# Patient Record
Sex: FEMALE | Race: BLACK OR AFRICAN AMERICAN | NOT HISPANIC OR LATINO | Employment: FULL TIME | URBAN - METROPOLITAN AREA
[De-identification: names, ages, dates, MRNs, and addresses within clinical notes are randomized per-mention and may not be internally consistent; named-entity substitution may affect disease eponyms.]

---

## 2019-07-28 ENCOUNTER — HOSPITAL ENCOUNTER (EMERGENCY)
Facility: HOSPITAL | Age: 34
Discharge: HOME | End: 2019-07-28
Attending: EMERGENCY MEDICINE
Payer: MEDICAID

## 2019-07-28 VITALS
WEIGHT: 222.25 LBS | TEMPERATURE: 98 F | BODY MASS INDEX: 39.38 KG/M2 | OXYGEN SATURATION: 100 % | DIASTOLIC BLOOD PRESSURE: 89 MMHG | HEART RATE: 90 BPM | HEIGHT: 63 IN | SYSTOLIC BLOOD PRESSURE: 146 MMHG | RESPIRATION RATE: 18 BRPM

## 2019-07-28 DIAGNOSIS — M25.471 ANKLE EDEMA, BILATERAL: Primary | ICD-10-CM

## 2019-07-28 DIAGNOSIS — M25.472 ANKLE EDEMA, BILATERAL: Primary | ICD-10-CM

## 2019-07-28 LAB
B-HCG UR QL: NEGATIVE
CTP QC/QA: YES

## 2019-07-28 PROCEDURE — 99284 PR EMERGENCY DEPT VISIT,LEVEL IV: ICD-10-PCS | Mod: ,,, | Performed by: EMERGENCY MEDICINE

## 2019-07-28 PROCEDURE — 81025 URINE PREGNANCY TEST: CPT | Performed by: EMERGENCY MEDICINE

## 2019-07-28 PROCEDURE — 99283 EMERGENCY DEPT VISIT LOW MDM: CPT

## 2019-07-28 PROCEDURE — 99284 EMERGENCY DEPT VISIT MOD MDM: CPT | Mod: ,,, | Performed by: EMERGENCY MEDICINE

## 2019-07-28 RX ORDER — HYDROCHLOROTHIAZIDE 12.5 MG/1
12.5 TABLET ORAL DAILY
Qty: 30 TABLET | Refills: 0 | Status: SHIPPED | OUTPATIENT
Start: 2019-07-28 | End: 2019-08-27

## 2019-07-28 NOTE — ED NOTES
LOC: The patient is awake and alert; oriented x 3 and speaking appropriately.  APPEARANCE: Patient resting comfortably, patient is clean and well groomed  SKIN: warm and dry, normal skin turgor & moist mucus membranes, skin intact, no breakdown noted.  MUSCULOSKELETAL: Patient moving all extremities well, no obvious swelling or deformities noted  RESPIRATORY: Airway is open and patent, breath sounds clear throughout all lung fields; respirations are spontaneous, normal effort and rate  CARDIAC: Patient has a normal rate,  peripheral edema noted to lower extremities and feet / ankles/ hands, capillary refill < 3 seconds; No complaints of chest pain   ABDOMEN: Soft and non tender to palpation, no distention noted.

## 2019-07-28 NOTE — ED TRIAGE NOTES
C/o intermittent swelling in her lower legs and ankles/ feet. And hands. Pt unsure of name of HTN med she took 3 yrs ago- Takes nothing now.

## 2019-07-28 NOTE — DISCHARGE INSTRUCTIONS
Please take new medication as directed. Please make sure to follow up with your PCP to discuss today's Emergency Department visit and for further evaluation and management. Please return to the Emergency Department if your symptoms worsen or you develop any additional concerning symptoms.

## 2019-07-29 NOTE — ED PROVIDER NOTES
"Encounter Date: 2019       History     Chief Complaint   Patient presents with    Swelling     both ankles and feet swelling, wake up normal,denies any history     Ms Sahni is a 35 yo female patient that presents to the ED with bilateral ankle and hand edema. Pt denies any trauma or injury. States symptoms occur every few months but resolve without intervention. Pt states that she also take a BP medication every once in a while but cannot remember the name. "Starts with an H and is really long". Symptoms have been present for approximately four days. Denies any pain. Denies any fevers, chills, body aches, headaches, dizziness, numbness, weakness, chest pain, shortness of breath, abdominal pain, N/V/D, urinary symptoms.         Review of patient's allergies indicates:  No Known Allergies  Past Medical History:   Diagnosis Date    Anxiety     Gestational diabetes     Hypertension      Past Surgical History:   Procedure Laterality Date     SECTION      TUBAL LIGATION       History reviewed. No pertinent family history.  Social History     Tobacco Use    Smoking status: Light Tobacco Smoker     Types: Cigarettes   Substance Use Topics    Alcohol use: No     Frequency: Never    Drug use: No     Review of Systems   Constitutional: Negative for fever.   HENT: Negative for sore throat.    Eyes: Negative for visual disturbance.   Respiratory: Negative for shortness of breath.    Cardiovascular: Positive for leg swelling. Negative for chest pain.   Gastrointestinal: Negative for abdominal pain, diarrhea, nausea and vomiting.   Genitourinary: Negative for dysuria, flank pain and frequency.   Musculoskeletal: Negative for back pain, neck pain and neck stiffness.   Skin: Negative for rash.   Neurological: Negative for dizziness, syncope, weakness, light-headedness, numbness and headaches.   Hematological: Does not bruise/bleed easily.   Psychiatric/Behavioral: Negative for confusion.       Physical Exam " "    Initial Vitals [07/28/19 1646]   BP Pulse Resp Temp SpO2   (!) 146/89 90 18 98.2 °F (36.8 °C) 100 %      MAP       --         Physical Exam    Constitutional: She appears well-developed and well-nourished. She is cooperative.  Non-toxic appearance. No distress.   HENT:   Head: Normocephalic and atraumatic.   Eyes: Conjunctivae and EOM are normal. Pupils are equal, round, and reactive to light.   Neck: Normal range of motion. Neck supple.   Cardiovascular: Normal rate. Exam reveals no gallop and no friction rub.    No murmur heard.  1+ edema to bilateral feet and ankles. Trace edema bilateral hands.    Pulmonary/Chest: Effort normal. No respiratory distress. She has no decreased breath sounds. She has no wheezes. She has no rhonchi. She has no rales.   Abdominal: Soft. There is no tenderness. There is no rigidity, no rebound, no guarding and no CVA tenderness.   Musculoskeletal: Normal range of motion.   Neurological: She is alert and oriented to person, place, and time.   Skin: Skin is warm, dry and intact.         ED Course   Procedures  Labs Reviewed   POCT URINE PREGNANCY          Imaging Results    None          Medical Decision Making:   Initial Assessment:   Ms Sahni is a 33 yo female patient that presents to the ED with bilateral ankle and hand edema. Pt denies any trauma or injury. States symptoms occur every few months but resolve without intervention. Pt states that she also take a BP medication every once in a while but cannot remember the name. "Starts with an H and is really long". Symptoms have been present for approximately four days. Denies any pain. Denies any fevers, chills, body aches, headaches, dizziness, numbness, weakness, chest pain, shortness of breath, abdominal pain, N/V/D, urinary symptoms.   ED Management:  Hemodynamically stable. Non-toxic and in no acute distress. Will discharge with prescription of HCTZ and have her f/u with PCP. Instructed patient to eat potassium rich foods. " Plan discussed and explained to patient who is understanding and agreeable with plan. Return instructions given.  All of the patient's questions were answered.  I reviewed the patient's chart and discussed the case with my supervising physician.                       Clinical Impression:       ICD-10-CM ICD-9-CM   1. Ankle edema, bilateral M25.471 719.07    M25.472          Disposition:   Disposition: Discharged  Condition: Stable                        Niels Hill PA-C  07/29/19 3235